# Patient Record
Sex: MALE | Race: WHITE | HISPANIC OR LATINO | Employment: FULL TIME | URBAN - METROPOLITAN AREA
[De-identification: names, ages, dates, MRNs, and addresses within clinical notes are randomized per-mention and may not be internally consistent; named-entity substitution may affect disease eponyms.]

---

## 2018-08-03 ENCOUNTER — OFFICE VISIT (OUTPATIENT)
Dept: FAMILY MEDICINE CLINIC | Facility: CLINIC | Age: 48
End: 2018-08-03

## 2018-08-03 VITALS
OXYGEN SATURATION: 99 % | BODY MASS INDEX: 27.1 KG/M2 | WEIGHT: 168.6 LBS | HEART RATE: 54 BPM | DIASTOLIC BLOOD PRESSURE: 90 MMHG | HEIGHT: 66 IN | SYSTOLIC BLOOD PRESSURE: 132 MMHG

## 2018-08-03 DIAGNOSIS — J30.89 CHRONIC NON-SEASONAL ALLERGIC RHINITIS, UNSPECIFIED TRIGGER: ICD-10-CM

## 2018-08-03 DIAGNOSIS — J45.990 EXERCISE-INDUCED BRONCHOSPASM: Primary | ICD-10-CM

## 2018-08-03 PROCEDURE — 99203 OFFICE O/P NEW LOW 30 MIN: CPT | Performed by: INTERNAL MEDICINE

## 2018-08-03 RX ORDER — ALBUTEROL SULFATE 90 UG/1
2 AEROSOL, METERED RESPIRATORY (INHALATION)
COMMUNITY
Start: 2018-04-19

## 2018-08-03 RX ORDER — CETIRIZINE HYDROCHLORIDE 10 MG/1
10 TABLET ORAL
COMMUNITY

## 2018-08-03 NOTE — PROGRESS NOTES
"Subjective   Poncho Patel is a 48 y.o. male who presents today for:    Exercise induced asthma (NP est care)    History of Present Illness     Reports chest tightness and wheezing whenever he would ride cyclocross.  This has diminished recently because he is not riding as much.  He usually does not get tightness in his chest or wheezing with his allergy flares.  Does not undergone formal testing for asthma.  He has used an albuterol inhaler for exercise induced bronchospasm in the past with good benefit.    He does have chronic allergies for which he has undergone immunotherapy on 2 occasions.  Currently, he only needs Zyrtec once a day to control his rhinitis.    Mr. Patel  reports that he has never smoked. He has never used smokeless tobacco. He reports that he drinks alcohol. Drug use questions deferred to the physician.     No Known Allergies    Current Outpatient Prescriptions:   •  albuterol (PROVENTIL HFA;VENTOLIN HFA) 108 (90 Base) MCG/ACT inhaler, Inhale 2 puffs., Disp: , Rfl:   •  cetirizine (zyrTEC) 10 MG tablet, Take 10 mg by mouth., Disp: , Rfl:       Review of Systems   Constitutional: Negative for unexpected weight change.   Respiratory: Positive for apnea, shortness of breath and wheezing.         Per HPI   Cardiovascular: Negative for chest pain and palpitations.     Patient Health History Form was reviewed during the ov.      Objective   Vitals:    08/03/18 0847   BP: 132/90   Pulse: 54   SpO2: 99%   Weight: 76.5 kg (168 lb 9.6 oz)   Height: 167.6 cm (66\")     Physical Exam   Constitutional: He is oriented to person, place, and time. He appears well-developed and well-nourished. No distress.   Eyes: Conjunctivae are normal. No scleral icterus.   Cardiovascular: Normal rate, regular rhythm and normal heart sounds.    Pulmonary/Chest: Effort normal and breath sounds normal. He has no wheezes.   Abdominal: Soft. Bowel sounds are normal.   Neurological: He is alert and oriented to person, place, " and time.             Poncho was seen today for exercise induced asthma.    Diagnoses and all orders for this visit:    Exercise-induced bronchospasm    Chronic non-seasonal allergic rhinitis, unspecified trigger    Continue the Zyrtec for the allergies.  If allergies worsen despite it, we will consider using Singulair.    Continue using the albuterol when needed prior to exercise.  If symptoms escalate and he is having symptoms at rest, he will contact our office so we can arrange formal pulmonary function testing and a follow-up thereafter.  We'll contact us if he needs a refill for his Proventil as well.

## 2019-04-19 DIAGNOSIS — Z00.00 ROUTINE GENERAL MEDICAL EXAMINATION AT A HEALTH CARE FACILITY: Primary | ICD-10-CM

## 2019-04-21 ENCOUNTER — RESULTS ENCOUNTER (OUTPATIENT)
Dept: FAMILY MEDICINE CLINIC | Facility: CLINIC | Age: 49
End: 2019-04-21

## 2019-04-21 DIAGNOSIS — Z00.00 ROUTINE GENERAL MEDICAL EXAMINATION AT A HEALTH CARE FACILITY: ICD-10-CM

## 2019-05-02 ENCOUNTER — OFFICE VISIT (OUTPATIENT)
Dept: FAMILY MEDICINE CLINIC | Facility: CLINIC | Age: 49
End: 2019-05-02

## 2019-05-02 VITALS
HEART RATE: 66 BPM | OXYGEN SATURATION: 98 % | BODY MASS INDEX: 29.44 KG/M2 | RESPIRATION RATE: 18 BRPM | TEMPERATURE: 98.2 F | WEIGHT: 183.2 LBS | HEIGHT: 66 IN | DIASTOLIC BLOOD PRESSURE: 62 MMHG | SYSTOLIC BLOOD PRESSURE: 110 MMHG

## 2019-05-02 DIAGNOSIS — Z00.00 ANNUAL PHYSICAL EXAM: Primary | ICD-10-CM

## 2019-05-02 PROCEDURE — 99396 PREV VISIT EST AGE 40-64: CPT | Performed by: NURSE PRACTITIONER

## 2019-05-02 NOTE — PROGRESS NOTES
Subjective   Poncho Patel is a 48 y.o. male.     Chief Complaint   Patient presents with   • Annual Exam     Preventative      Mr. Patel presents today for his annual physical exam. He states he is doing well and voices no concerns today.     I have reviewed the patient's medical history in detail and updated the computerized patient record.    The following portions of the patient's history were reviewed and updated as appropriate: allergies, current medications, past family history, past medical history, past social history, past surgical history and problem list.      Current Outpatient Medications:   •  albuterol (PROVENTIL HFA;VENTOLIN HFA) 108 (90 Base) MCG/ACT inhaler, Inhale 2 puffs., Disp: , Rfl:   •  cetirizine (zyrTEC) 10 MG tablet, Take 10 mg by mouth., Disp: , Rfl:      Review of Systems   Constitutional: Negative.    Respiratory: Negative.    Cardiovascular: Negative.    Musculoskeletal: Negative.    Skin: Negative.    Neurological: Negative.    All other systems reviewed and are negative.      Objective    Vitals:    05/02/19 0904   BP: 110/62   Pulse: 66   Resp: 18   Temp: 98.2 °F (36.8 °C)   SpO2: 98%     Physical Exam   Constitutional: He is oriented to person, place, and time. He appears well-developed and well-nourished.   HENT:   Right Ear: External ear normal.   Left Ear: External ear normal.   Mouth/Throat: Oropharynx is clear and moist.   Neck: Normal range of motion. Neck supple. No thyromegaly present.   Cardiovascular: Normal rate, regular rhythm, normal heart sounds and intact distal pulses.   Pulmonary/Chest: Effort normal and breath sounds normal.   Abdominal: Soft. Bowel sounds are normal.   Musculoskeletal: Normal range of motion. He exhibits no edema.   Lymphadenopathy:     He has no cervical adenopathy.   Neurological: He is alert and oriented to person, place, and time.   Skin: Skin is warm and dry.   Psychiatric:   No acute distress   Vitals reviewed.        Assessment/Plan    Poncho was seen today for annual exam.    Diagnoses and all orders for this visit:    Annual physical exam    1. His physical exam is within normal limits today.   2. He is up to date with his health maintenance screening.  3. We discussed age appropriate behaviors such as getting regular exercise and eating a healthy diet.  4. He did not get his labs drawn prior to this exam.  He is fasting today and he can have the labs drawn while he is here.   5. He is to follow up as needed and in 1 year for his next annual exam.

## 2019-05-03 LAB
ALBUMIN SERPL-MCNC: 4.4 G/DL (ref 3.5–5.2)
ALBUMIN/GLOB SERPL: 1.5 G/DL
ALP SERPL-CCNC: 74 U/L (ref 39–117)
ALT SERPL-CCNC: 30 U/L (ref 1–41)
APPEARANCE UR: CLEAR
AST SERPL-CCNC: 20 U/L (ref 1–40)
BILIRUB SERPL-MCNC: 0.5 MG/DL (ref 0.2–1.2)
BILIRUB UR QL STRIP: NEGATIVE
BUN SERPL-MCNC: 10 MG/DL (ref 6–20)
BUN/CREAT SERPL: 9 (ref 7–25)
CALCIUM SERPL-MCNC: 10.1 MG/DL (ref 8.6–10.5)
CHLORIDE SERPL-SCNC: 99 MMOL/L (ref 98–107)
CHOLEST SERPL-MCNC: 245 MG/DL (ref 0–200)
CO2 SERPL-SCNC: 26.6 MMOL/L (ref 22–29)
COLOR UR: YELLOW
CREAT SERPL-MCNC: 1.11 MG/DL (ref 0.76–1.27)
ERYTHROCYTE [DISTWIDTH] IN BLOOD BY AUTOMATED COUNT: 12.4 % (ref 12.3–15.4)
GLOBULIN SER CALC-MCNC: 3 GM/DL
GLUCOSE SERPL-MCNC: 103 MG/DL (ref 65–99)
GLUCOSE UR QL: NEGATIVE
HCT VFR BLD AUTO: 44 % (ref 37.5–51)
HDLC SERPL-MCNC: 54 MG/DL (ref 40–60)
HGB BLD-MCNC: 15 G/DL (ref 13–17.7)
HGB UR QL STRIP: NEGATIVE
KETONES UR QL STRIP: NEGATIVE
LDLC SERPL CALC-MCNC: 156 MG/DL (ref 0–100)
LEUKOCYTE ESTERASE UR QL STRIP: NEGATIVE
MCH RBC QN AUTO: 30.5 PG (ref 26.6–33)
MCHC RBC AUTO-ENTMCNC: 34.1 G/DL (ref 31.5–35.7)
MCV RBC AUTO: 89.4 FL (ref 79–97)
NITRITE UR QL STRIP: NEGATIVE
PH UR STRIP: 6 [PH] (ref 5–8)
PLATELET # BLD AUTO: 227 10*3/MM3 (ref 140–450)
POTASSIUM SERPL-SCNC: 4.5 MMOL/L (ref 3.5–5.2)
PROT SERPL-MCNC: 7.4 G/DL (ref 6–8.5)
PROT UR QL STRIP: NEGATIVE
RBC # BLD AUTO: 4.92 10*6/MM3 (ref 4.14–5.8)
SODIUM SERPL-SCNC: 138 MMOL/L (ref 136–145)
SP GR UR: 1.02 (ref 1–1.03)
TRIGL SERPL-MCNC: 174 MG/DL (ref 0–150)
UROBILINOGEN UR STRIP-MCNC: NORMAL MG/DL
VLDLC SERPL CALC-MCNC: 34.8 MG/DL
WBC # BLD AUTO: 5.03 10*3/MM3 (ref 3.4–10.8)

## 2023-08-09 ENCOUNTER — TREATMENT (OUTPATIENT)
Dept: PHYSICAL THERAPY | Facility: CLINIC | Age: 53
End: 2023-08-09
Payer: COMMERCIAL

## 2023-08-09 DIAGNOSIS — M25.862 PATELLOFEMORAL DYSFUNCTION OF LEFT KNEE: ICD-10-CM

## 2023-08-09 DIAGNOSIS — M25.562 LEFT KNEE PAIN, UNSPECIFIED CHRONICITY: Primary | ICD-10-CM

## 2023-08-09 PROCEDURE — 97161 PT EVAL LOW COMPLEX 20 MIN: CPT | Performed by: PHYSICAL THERAPIST

## 2023-08-09 PROCEDURE — 97110 THERAPEUTIC EXERCISES: CPT | Performed by: PHYSICAL THERAPIST

## 2023-08-09 PROCEDURE — 97530 THERAPEUTIC ACTIVITIES: CPT | Performed by: PHYSICAL THERAPIST

## 2023-08-09 NOTE — PROGRESS NOTES
"Physical Therapy Initial Evaluation and Plan of Care  [address], [address], [place] [address]    Patient: Poncho Patel   : 1970  Diagnosis/ICD-10 Code:  Left knee pain, unspecified chronicity [M25.562]  Referring practitioner: Louisa Tucker DO    Subjective Evaluation    History of Present Illness  Onset date: May 2023.  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  He states his (L) knee began bothering him around May 2023, and then (B) wrists began hurting.  Within past 3-4 days his (R) knee has also become painful.      He began noticing ascending steps would cause pain at (L) superolateral aspect of knee.  Then he noticed swelling and a feeling of (L) knee begin \"not normal.\"  It felt at times to him somewhat unstable, but this would improve with a neoprene knee sleeve.  His (L) knee flexion became limited and it became painful to kneel on (L) knee.  He reports infrequent painful popping in his (L) knee which causes his knee to feel better afterward.      He is an avid cyclist and has not been cycling out of hesitation of not wanting to make his knee worse.  Patient will be undergoing some updated bloodwork near the end of August.      Patient Occupation: [employer] (works from home)   Precautions and Work Restrictions: hobbies - cycling, son wants him to begin participating in disc golfQuality of life: good    Pain  Current pain ratin  At best pain ratin  At worst pain ratin  Location: (L) superolateral aspect of anterior knee  Quality: sharp  Alleviating factors: consciously using legs more symmetrically.  Aggravating factors: prolonged positioning, stairs and squatting (running)  Progression: no change    Diagnostic Tests  X-ray: normal (mild lateral tilt)    Patient Goals  Patient goal: understanding what is going on, knowing if I need to avoid certain activities, understand whether this will go away         Subjective Questionnaire: LEFS: " "50/80    Objective          Tenderness     Additional Tenderness Details  Neg TTP (B) knees    Active Range of Motion   Left Knee   Flexion: 126 degrees with pain  Extension: 0 degrees   Extensor la degrees     Right Knee   Flexion: 122 degrees with pain  Extension: 0 degrees   Extensor lag: 3 degrees     Patellar Mobility   Left Knee Patellar tendons within functional limits include the lateral. Hypermobile in the left medial patellar tendon(s). Hypomobile in the left superior and left inferior patellar tendon(s).     Right Knee Patellar tendons within functional limits include the lateral. Hypermobile in the medial patellar tendon(s). Hypomobile in the superior and inferior patellar tendon(s).     Strength/Myotome Testing     Left Hip   Planes of Motion   Flexion: 4-  Extension: 4  Abduction: 4  External rotation: 4-  Internal rotation: 4-    Right Hip   Planes of Motion   Flexion: 4  Extension: 4  Abduction: 4  External rotation: 4-  Internal rotation: 4-    Left Knee   Flexion: 4+  Extension: 4+  Quadriceps contraction: good    Right Knee   Flexion: 4+  Extension: 4+  Quadriceps contraction: good    Swelling     Left Knee Girth Measurement (cm)   Joint line: 39.4.    Right Knee Girth Measurement (cm)   Joint line: 38.7.    Functional Assessment     Forward Step Down 6\"   Left Leg  Positive Trendelenburg, ipsilateral trunk side bending, increased forward trunk lean and valgus.     Right Leg  Positive Trendelenburg and increased forward trunk lean.     Comments  Functional hip strength: single leg bridge - moderate pelvic drop (B)  Single leg sit to stand -- great difficulty (L) LE with (L) superomedial knee pain; mild difficulty (R) LE without pain          Assessment & Plan     Assessment  Impairments: abnormal muscle firing, activity intolerance, impaired physical strength, lacks appropriate home exercise program and pain with function  Functional Limitations: uncomfortable because of pain and " "stooping  Assessment details: Patient is a 53 y.o male who reports onset of (L) knee pain around May 2023, initially noticeable with ascending steps.  Since then, patient has also experienced onset of (B) wrist pain and swelling and more recently (past 2-3 days) (R) knee pain similar to his (L).  Xray revealed mild (L) patellar lateral tilt and trace tricompartmental arthritis.  He reports (L) knee symptoms rated 0-7/10, worst with ascending steps, prolonged sitting with knees flexed, and squatting.  He has been avoiding cycling for fear of provoking symptoms.  He exhibits decreased (B) knee AROM as well as trunk and proximal LE strength.  His LEFS score is 50/80 indicating a moderate perceived level of functional limitation.  He may benefit from skilled PT services to address these deficits and assist patient in return to optimal functional level including regular painfree cycling.  Prognosis: good    Goals  Plan Goals: STGs: to be met in 4 weeks  1. Patient will be independent with initial HEP  2. Patient will report improved (B) knee symptoms 0-3/10 for improved tolerance to ADLs and functional activities  3. Patient will report 50% reduced frequency of (L) knee feeling \"unstable\" for improved activity tolerance  4. Patient will demonstrate improved (B) knee AROM 0-130 deg for improved positional tolerance  5. Patient will perform 3 x 10 single leg bridge with SLR without pelvic drop as evidence of improved functional trunk and hip strength to improve knee alignment during weightbearing activities    LTGs: to be met in 8 weeks  1. Patient will be independent with progressed HEP  2. Patient will report resolution of (B) knee symptoms for normal tolerance to negotiating steps within his home  3. Patient will demonstrate single leg bridge endurance test x 10 sec (B) without compensation for evidence of sufficient trunk and hips muscular endurance to improve lower extremity control  4. Patient will perform forward " "stepdown from 6\" height with good pelvic stability and absence of dynamic valgus as evidence of sufficient neuromuscular control to normalize LE loading activities  5. Patient will successfully return to cycling at reasonable pace and volume  6. Patient will have improved LEFS score >/= 60/80 for subjective evidence of functional improvement    Plan  Therapy options: will be seen for skilled therapy services  Planned modality interventions: cryotherapy, electrical stimulation/Russian stimulation and thermotherapy (hydrocollator packs)  Planned therapy interventions: abdominal trunk stabilization, flexibility, functional ROM exercises, gait training, home exercise program, joint mobilization, manual therapy, neuromuscular re-education, soft tissue mobilization, strengthening, stretching and therapeutic activities  Frequency: 2x week  Duration in weeks: 12  Treatment plan discussed with: patient      Manual Therapy:         mins  49561;  Therapeutic Exercise:    16     mins  56424;     Neuromuscular June:        mins  51844;    Therapeutic Activity:     12     mins  75734;     Gait Training:           mins  23681;     Ultrasound:          mins  51534;    Electrical Stimulation:         mins  23973 (MC );  Dry Needling          mins self-pay    Timed Treatment:   28   mins   Total Treatment:     56   mins    PT SIGNATURE: Shannan Wright PT, DPT, OCS  Electronically signed by: Shannan Wright PT, 08/09/23, 9:10 AM EDT  [place] License #[id number]     DATE TREATMENT INITIATED: 8/9/2023    Initial Certification  Certification Period: 8/9/2023 thru 11/6/2023  I certify that the therapy services are furnished while this patient is under my care.  The services outlined above are required by this patient, and will be reviewed every 90 days.     PHYSICIAN: Louisa Tucker DO  [phone]                                          DATE:     Please sign and return via fax to [phone]. Thank you, [facility] " Therapy.

## 2023-08-16 ENCOUNTER — TREATMENT (OUTPATIENT)
Dept: PHYSICAL THERAPY | Facility: CLINIC | Age: 53
End: 2023-08-16
Payer: COMMERCIAL

## 2023-08-16 DIAGNOSIS — M25.862 PATELLOFEMORAL DYSFUNCTION OF LEFT KNEE: ICD-10-CM

## 2023-08-16 DIAGNOSIS — M25.562 LEFT KNEE PAIN, UNSPECIFIED CHRONICITY: Primary | ICD-10-CM

## 2023-08-16 PROCEDURE — 97112 NEUROMUSCULAR REEDUCATION: CPT | Performed by: PHYSICAL THERAPIST

## 2023-08-16 PROCEDURE — 97110 THERAPEUTIC EXERCISES: CPT | Performed by: PHYSICAL THERAPIST

## 2023-08-16 NOTE — PROGRESS NOTES
Physical Therapy Daily Treatment Note      [address], [address], [place] [id number]    Patient: Poncho Patel   : 1970  Referring practitioner: Louisa Tucker DO  Date of Initial Visit: Type: THERAPY  Noted: 2023  Today's Date: 2023  Patient seen for 2 sessions         Visit Diagnoses:     ICD-10-CM ICD-9-CM   1. Left knee pain, unspecified chronicity  M25.562 719.46   2. Patellofemoral dysfunction of left knee  M25.862 719.86         Subjective Evaluation    History of Present Illness    Subjective comment: I have stiffness in the mornings which improves after I get moving.  The hand surgeon said xrays of my wrists look ok so he is ordering (B) wrist/hand MRIs.  My right knee is acting up more than my left one, so it feels like my body is running through a course of something.Pain  Pain scale: 1-2/10 (R) knee, 0/10 (L) knee.         Objective   See Exercise, Manual, and Modality Logs for complete treatment.   *Initiated eccentric chair squat and standing fire hyrdrant    Assessment & Plan       Assessment  Assessment details: Patient currently reports (R) knee symptoms > (L) knee symptoms.  It remains concerning that patient is experiencing bilateral multi-joint symptoms which appear in similar locations and seem to run a similar course.  Patient benefits from corrective cueing during single leg bridge activity but does a good job of self-monitoring form throughout all activities.  Instructed in eccentric chair squats and standing fire hydrants to progress hip stabilizer strengthening.  Patient exhibits functional hip stabilizer weakness at end range eccentric component of chair squat and in stance leg during standing fire hydrants, evidenced by early onset of muscular fatigue.        Progress strengthening /stabilization /functional activity         Timed:  Manual Therapy:         mins  37950;  Therapeutic Exercise:    14     mins  73615;     Neuromuscular June:    29    mins   95021;    Therapeutic Activity:          mins  43764;     Gait Training:           mins  86295;     Ultrasound:          mins  33639;    Untimed:  Electrical Stimulation:         mins  14169 ( );  Mechanical Traction:         mins  10057;   Dry Needling              ___  mins   20561    Timed Treatment:   43   mins   Total Treatment:     43   mins    Shannan Wright PT, DPT, OCS  Physical Therapist  [place] License #[id number]  Electronically signed by: Shannan Wright PT, 08/16/23, 9:15 AM EDT

## 2023-08-23 ENCOUNTER — TREATMENT (OUTPATIENT)
Dept: PHYSICAL THERAPY | Facility: CLINIC | Age: 53
End: 2023-08-23
Payer: COMMERCIAL

## 2023-08-23 DIAGNOSIS — M25.862 PATELLOFEMORAL DYSFUNCTION OF LEFT KNEE: ICD-10-CM

## 2023-08-23 DIAGNOSIS — M25.562 LEFT KNEE PAIN, UNSPECIFIED CHRONICITY: Primary | ICD-10-CM

## 2023-08-23 PROCEDURE — 97110 THERAPEUTIC EXERCISES: CPT | Performed by: PHYSICAL THERAPIST

## 2023-08-23 PROCEDURE — 97112 NEUROMUSCULAR REEDUCATION: CPT | Performed by: PHYSICAL THERAPIST

## 2023-08-23 NOTE — PROGRESS NOTES
Physical Therapy Daily Treatment Note      [address], [address], [place] [id number]    Patient: Poncho Patel   : 1970  Referring practitioner: Louisa Tucker DO  Date of Initial Visit: Type: THERAPY  Noted: 2023  Today's Date: 2023  Patient seen for 3 sessions         Visit Diagnoses:     ICD-10-CM ICD-9-CM   1. Left knee pain, unspecified chronicity  M25.562 719.46   2. Patellofemoral dysfunction of left knee  M25.862 719.86         Subjective Evaluation    History of Present Illness    Subjective comment: I think my knees are running through the course of whatever irritated them.  The right knee is more uncomfortable than the left one right know but I think they're both a little better.  I am not sure whether that is from the Meloxicam, collagen supplements, and or exercises.  I have MRIs on both wrists tomorrow.     Objective   See Exercise, Manual, and Modality Logs for complete treatment.   *Added forward stepdown/heel lowering and Y balance    Assessment & Plan       Assessment  Assessment details: Patient demonstrates improved ability to perform chair squat and standing fire hydrant activities without notable compensation and increased time to onset of muscular fatigue.  Discussed at length the role of core/trunk and hip strength on knee position/alignment and function as well as tendency toward patellar maltracking.  He benefits from visual cueing offered by mirror placed in front of him during performance of closed chain strength and stability activities so he is able to effectively monitor his form.        Progress strengthening /stabilization /functional activity. Assess for MD.         Timed:  Manual Therapy:         mins  84650;  Therapeutic Exercise:    23     mins  55626;     Neuromuscular June:    28    mins  70335;    Therapeutic Activity:          mins  38933;     Gait Training:           mins  90428;     Ultrasound:          mins  52707;    Untimed:  Electrical  Stimulation:         mins  47343 ( );  Mechanical Traction:         mins  86458;   Dry Needling              ___  mins   20561    Timed Treatment:   51   mins   Total Treatment:     51   mins    Shannan Wright PT, DPT, [facility]  Physical Therapist  [place] License #[id number]  Electronically signed by: Shannan Wright PT, 08/23/23, 9:29 AM EDT

## 2023-08-30 ENCOUNTER — TREATMENT (OUTPATIENT)
Dept: PHYSICAL THERAPY | Facility: CLINIC | Age: 53
End: 2023-08-30
Payer: COMMERCIAL

## 2023-08-30 DIAGNOSIS — M25.862 PATELLOFEMORAL DYSFUNCTION OF LEFT KNEE: ICD-10-CM

## 2023-08-30 DIAGNOSIS — M25.562 LEFT KNEE PAIN, UNSPECIFIED CHRONICITY: Primary | ICD-10-CM

## 2023-08-30 PROCEDURE — 97110 THERAPEUTIC EXERCISES: CPT | Performed by: PHYSICAL THERAPIST

## 2023-08-30 PROCEDURE — 97112 NEUROMUSCULAR REEDUCATION: CPT | Performed by: PHYSICAL THERAPIST

## 2023-08-30 NOTE — PROGRESS NOTES
"Physical Therapy Daily Treatment Note               [address]                                                                                                                                             [place] [address]    Patient: Poncho Patel   : 1970  Referring practitioner: Louisa Tucker DO  Date of Initial Visit: Type: THERAPY  Noted: 2023  Today's Date: 2023  Patient seen for 4 sessions       Visit Diagnoses:    ICD-10-CM ICD-9-CM   1. Left knee pain, unspecified chronicity  M25.562 719.46   2. Patellofemoral dysfunction of left knee  M25.862 719.86       Subjective Evaluation    History of Present Illness    Subjective comment: Pt reports that his (B) knees \"feel unstable\", but he has not had any issues with them actually being unstable.     Objective   See Exercise, Manual, and Modality Logs for complete treatment.       Assessment & Plan       Assessment  Assessment details: Pt continues to demonstrate improvement in balance and muscular endurance.  Pt has not been able perform his Y balance drill at home due to his carpet.  Demonstrated modification of cone taps.  Continue to progress per POC.        Timed:         Manual Therapy:    0     mins  64968;     Therapeutic Exercise:    13     mins  77287;     Neuromuscular June:    25    mins  72754;    Therapeutic Activity:     0     mins  07501;     Gait Trainin     mins  03472;     Ultrasound:     0     mins  99978;    E Stim                            00    mins   26863( [id number])  Work [place]/Cond      0    mins   88680        Timed Treatment:   38   mins   Total Treatment:     38   mins    Benny Ortez PTA  [place] License: [id number]  "

## 2023-08-31 ENCOUNTER — OFFICE VISIT (OUTPATIENT)
Dept: SPORTS MEDICINE | Facility: CLINIC | Age: 53
End: 2023-08-31
Payer: COMMERCIAL

## 2023-08-31 DIAGNOSIS — M25.862 PATELLOFEMORAL DYSFUNCTION OF LEFT KNEE: ICD-10-CM

## 2023-08-31 DIAGNOSIS — M25.562 LEFT KNEE PAIN, UNSPECIFIED CHRONICITY: Primary | ICD-10-CM

## 2023-08-31 DIAGNOSIS — R29.898 IMPAIRED FLEXIBILITY OF LOWER EXTREMITY: ICD-10-CM

## 2023-08-31 NOTE — PROGRESS NOTES
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  Initial images showed trace tricompartmental narrowing and spurring and lateral patellar tilt. Symptoms appeared consistent with patellofemoral dysfunction and conservative and supportive management was recommended. Please see previous notes for complete history and treatment course. He returns today reporting some improvement. Still has pain in the superolateral aspect. Pain is usually 3/10 but can be 6-7/10 at worst. Has noticed some discrepancy in his strength with PT. Has had 4 session of PT and has been compliant with his HEP. Has been taking Meloxicam daily and also taking a collagen supplement. Did a 5 mile ride without issues. Has also been playing disc golf once per week. Last week he states that his right knee was slightly worse than left.  Denies any new injuries.    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  He has recently had a flare of bilateral wrist pain and has been seeing a specialist. Was told that x-rays were normal but is awaiting results from his bilateral MRI and has a follow-up with Dr. Rodriguez in a couple weeks.     He is an avid cyclist.  Reports being left leg dominant.  He had COVID in December and subsequent elevated resting heart rate so was slow to return to cycling.  He does admit to trying to return in May with significantly decreased distance for him (10 miles or about 30 minutes), but then had to stop secondary to the knee pain.       /84   Pulse 65   Temp 97.7 °F (36.5 °C) (Infrared)   Resp 14   Ht 167.6 cm (65.98\")   Wt 80.7 kg (178 lb)   SpO2 98%   BMI 28.75 kg/m²      Physical Exam  [facility] " Exam:  The left knee is without obvious signs of acute bony deformity, quadriceps atrophy, swelling, erythema, ecchymosis or joint effusion. Patellar tenderness has improved. Apprehension is negative with medial and lateral glide. Patella crepitus is mildly positive. The medial and lateral joint lines are nontender and without bony crepitus or step-off. Flexion and extension are WNL and symmetrical. Knee and hip strength is 4+/5. Varus & valgus stress, Lachman's, anterior drawer, Alicia's, and posterior drawer are all negative. Gait is pain-free and tandem.    Assessment and Plan  Diagnoses and all orders for this visit:    1. Left knee pain, unspecified chronicity (Primary)    2. Patellofemoral dysfunction of left knee    3. 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  Initial images showed trace tricompartmental narrowing and spurring and lateral patellar tilt. Symptoms appeared consistent with patellofemoral dysfunction and conservative and supportive management was recommended.  He returns today with some improvement, but mildly persistent symptoms. I stressed the importance of continuing with PT and compliance with his HEP. He may continue with supportive measures such as bracing, ice and/or heat, anti-inflammatories, and Tylenol as needed. We reviewed his activity level and I am okay with him very gradually returning to cycling so long as his symptoms remain mild. HE should continue to follow-up with Dr. Rodriguez. If there is an underlying inflammatory arthritis, this may be contributing to his symptoms.  We will follow-up in 8 weeks. All of his questions were answered and he is agreeable with the plan.    Total time: 50 minutes. This includes time spent with the patient, but also time spent before the visit reviewing the  chart and time after the visit documenting the visit, reviewing labs, imaging studies, etc.      Dictated utilizing Dragon dictation.

## 2023-09-06 ENCOUNTER — TREATMENT (OUTPATIENT)
Dept: PHYSICAL THERAPY | Facility: CLINIC | Age: 53
End: 2023-09-06
Payer: COMMERCIAL

## 2023-09-06 DIAGNOSIS — M25.562 LEFT KNEE PAIN, UNSPECIFIED CHRONICITY: Primary | ICD-10-CM

## 2023-09-06 DIAGNOSIS — M25.862 PATELLOFEMORAL DYSFUNCTION OF LEFT KNEE: ICD-10-CM

## 2023-09-06 PROCEDURE — 97110 THERAPEUTIC EXERCISES: CPT | Performed by: PHYSICAL THERAPIST

## 2023-09-06 PROCEDURE — 97112 NEUROMUSCULAR REEDUCATION: CPT | Performed by: PHYSICAL THERAPIST

## 2023-09-06 NOTE — PROGRESS NOTES
Physical Therapy Daily Treatment Note      [address], [address], [place] [address]    Patient: Poncho Patel   : 1970  Referring practitioner: Louisa Tucker DO  Date of Initial Visit: Type: THERAPY  Noted: 2023  Today's Date: 2023  Patient seen for 5 sessions         Visit Diagnoses:     ICD-10-CM ICD-9-CM   1. Left knee pain, unspecified chronicity  M25.562 719.46   2. Patellofemoral dysfunction of left knee  M25.862 719.86         Subjective Evaluation    History of Present Illness    Subjective comment: My left knee started acting up yesterday, but I had a couple days of being out on the disc golf [place] this weekend.  It feels like a mechanical type pain and is mostly in the back of the knee instead of the front. I did ride about 5 miles (18-20 minutes) on the . My knees were initially a little stiff but it got easier as I went on.     Objective   See Exercise, Manual, and Modality Logs for complete treatment.       Assessment & Plan       Assessment  Assessment details: Patient appears to have some lingering lower leg and ankle stabilizer mm fatigue, likely from 2 consecutive days on the disc golf course, half of which is quite hilly.  Patient admits some fluctuation in (B) knee symptoms which he cannot attribute to activity or specific provocation.  Discussed careful and gradual progression of cycling to conservatively increase time and frequency (per week) with a goal of returning to riding approximately 30-45 minutes 4-5 times per week eventually, symptom-free.  Instructed patient to avoid aggressiveness of his disc golf throw to minimize (L) knee torque. Encouraged persistent HEP compliance and issued blue band for progression.  Plan to await outcome of patient's appointment with Dr. Rodriguez to discuss wrist/hand MRI results and plan.        Other - await outcome of patient appointment with Dr. Rodriguez.         Timed:  Manual Therapy:         mins   16608;  Therapeutic Exercise:    15     mins  16238;     Neuromuscular June:    26    mins  45225;    Therapeutic Activity:          mins  98389;     Gait Training:           mins  80298;     Ultrasound:          mins  77877;    Untimed:  Electrical Stimulation:         mins  55182 ( );  Mechanical Traction:         mins  51818;   Dry Needling              ___  mins   09973    Timed Treatment:   41   mins   Total Treatment:     44   mins    Shannan Wright PT, DPT, OCS  Physical Therapist  [place] License #[id number]  Electronically signed by: Shannan Wright PT, 09/06/23, 9:24 AM EDT

## 2023-09-14 VITALS
BODY MASS INDEX: 28.61 KG/M2 | HEIGHT: 66 IN | TEMPERATURE: 97.7 F | WEIGHT: 178 LBS | SYSTOLIC BLOOD PRESSURE: 128 MMHG | DIASTOLIC BLOOD PRESSURE: 84 MMHG | HEART RATE: 65 BPM | RESPIRATION RATE: 14 BRPM | OXYGEN SATURATION: 98 %

## 2023-09-28 ENCOUNTER — OFFICE VISIT (OUTPATIENT)
Dept: FAMILY MEDICINE CLINIC | Facility: CLINIC | Age: 53
End: 2023-09-28
Payer: COMMERCIAL

## 2023-09-28 VITALS
SYSTOLIC BLOOD PRESSURE: 128 MMHG | DIASTOLIC BLOOD PRESSURE: 84 MMHG | HEIGHT: 66 IN | HEART RATE: 87 BPM | WEIGHT: 174.1 LBS | OXYGEN SATURATION: 99 % | BODY MASS INDEX: 27.98 KG/M2

## 2023-09-28 DIAGNOSIS — Z00.00 ANNUAL PHYSICAL EXAM: Primary | ICD-10-CM

## 2023-09-28 PROCEDURE — 99396 PREV VISIT EST AGE 40-64: CPT | Performed by: NURSE PRACTITIONER

## 2023-09-28 NOTE — PROGRESS NOTES
"Chief Complaint  Annual Exam    Subjective        Poncho Patel presents to [facility]  History of Present Illness  Mr. Patel presents today for an annual physical exam with lab review.     I have reviewed the patient's medical history in detail and updated the computerized patient record.       Current Outpatient Medications:     albuterol sulfate  (90 Base) MCG/ACT inhaler, Inhale 2 puffs Every 4 (Four) Hours As Needed for Wheezing., Disp: 18 g, Rfl: 5    cetirizine (zyrTEC) 10 MG tablet, Take 1 tablet by mouth., Disp: , Rfl:     meloxicam (Mobic) 7.5 MG tablet, Take 1 tablet by mouth Daily. (Patient not taking: Reported on 9/28/2023), Disp: 90 tablet, Rfl: 1     Review of Systems   Constitutional: Negative.    HENT: Negative.     Eyes: Negative.    Respiratory: Negative.     Cardiovascular: Negative.    Gastrointestinal: Negative.    Genitourinary: Negative.    Musculoskeletal:  Positive for arthralgias (wrists, knees).   Neurological: Negative.    Psychiatric/Behavioral: Negative.        Objective   Vital Signs:  /84 (BP Location: Left arm, Patient Position: Sitting, Cuff Size: Adult)   Pulse 87   Ht 167.6 cm (65.98\")   Wt 79 kg (174 lb 1.6 oz)   SpO2 99%   BMI 28.11 kg/m²   Estimated body mass index is 28.11 kg/m² as calculated from the following:    Height as of this encounter: 167.6 cm (65.98\").    Weight as of this encounter: 79 kg (174 lb 1.6 oz).       Physical Exam  Constitutional:       Appearance: Normal appearance.   Neck:      Vascular: No carotid bruit.   Cardiovascular:      Rate and Rhythm: Normal rate and regular rhythm.      Pulses: Normal pulses.      Heart sounds: Normal heart sounds.   Pulmonary:      Effort: Pulmonary effort is normal.      Breath sounds: Normal breath sounds.   Abdominal:      General: Bowel sounds are normal.      Palpations: Abdomen is soft.   Musculoskeletal:         General: Normal range of motion.      Cervical " back: Normal range of motion.   Skin:     General: Skin is warm and dry.   Neurological:      Mental Status: He is alert and oriented to person, place, and time.   Psychiatric:      Comments: No acute distress      Result Review :    Common labs          9/21/2023    08:29   Common Labs   Glucose 104    BUN 11    Creatinine 1.08    Sodium 134    Potassium 4.4    Chloride 96    Calcium 9.8    Total Protein 7.6    Albumin 4.5    Total Bilirubin 0.4    Alkaline Phosphatase 119    AST (SGOT) 16    ALT (SGPT) 15    WBC 6.5    Hemoglobin 13.8    Hematocrit 40.8    Platelets 310    Total Cholesterol 206    Triglycerides 119    HDL Cholesterol 54    LDL Cholesterol  131    Hemoglobin A1C 5.7    PSA 1.6                   Assessment and Plan   Diagnoses and all orders for this visit:    1. Annual physical exam (Primary)    Mr. Patel presents today for an annual physical exam with lab review.   His physical exam is unremarkable.  I have reviewed his labs with him today. His lipid levels are not at goal. And his A1C is slightly elevated. We discussed that life style changes will help improve his labs.  We have discussed age related healthy behaviors.          Follow Up   No follow-ups on file.  Patient was given instructions and counseling regarding his condition or for health maintenance advice. Please see specific information pulled into the AVS if appropriate.

## 2023-10-26 ENCOUNTER — OFFICE VISIT (OUTPATIENT)
Dept: SPORTS MEDICINE | Facility: CLINIC | Age: 53
End: 2023-10-26
Payer: COMMERCIAL

## 2023-10-26 VITALS — BODY MASS INDEX: 27.97 KG/M2 | HEIGHT: 66 IN | WEIGHT: 174 LBS | TEMPERATURE: 98.1 F

## 2023-10-26 DIAGNOSIS — M25.562 LEFT KNEE PAIN, UNSPECIFIED CHRONICITY: Primary | ICD-10-CM

## 2023-10-26 DIAGNOSIS — M25.862 PATELLOFEMORAL DYSFUNCTION OF LEFT KNEE: ICD-10-CM

## 2023-10-26 PROCEDURE — 99213 OFFICE O/P EST LOW 20 MIN: CPT | Performed by: STUDENT IN AN ORGANIZED HEALTH CARE EDUCATION/TRAINING PROGRAM

## 2023-10-26 NOTE — PROGRESS NOTES
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  Initial images showed trace tricompartmental narrowing and spurring and lateral patellar tilt. Symptoms appeared consistent with patellofemoral dysfunction and conservative and supportive management was recommended. Please see previous notes for complete history and treatment course. He returns today reporting mild improvement but persistent symptoms. He has seen Rheumatology who thinks he may possible have psoriatic arthritis but without the skin manifestation. He recently increased his meloxicam to 15 mg on Sunday. He had stopped taking it before then. His left knee still tends to be more bothersome. Pain is rated 2-3/10. Has not yet returned to cycling due to busy work schedule. Played disc golf over the weekend and about [employer] through had increased pain of the knee. Admits to not doing HEP as much lately over the past month. Denies any new injuries or complaints.    He is an avid cyclist.  Reports being left leg dominant.  He had COVID in December 2022 and subsequent elevated resting heart rate so was slow to return to cycling.  He does admit to trying to return in May with significantly decreased distance for him (10 miles or about 30 minutes), but then had to stop secondary to the knee pain.    Temp 98.1 °F (36.7 °C) (Infrared)   Ht 167.6 cm (65.98\")   Wt 78.9 kg (174 lb)   BMI 28.10 kg/m²      Physical Exam  MSK Exam:  The left knee is without obvious signs of acute bony deformity, quadriceps atrophy, swelling, erythema, ecchymosis or joint effusion. Patellar tenderness has improved. Apprehension is negative with medial and lateral glide. Patella crepitus is mildly positive. The medial and lateral joint lines are " nontender and without bony crepitus or step-off. Flexion and extension are WNL and symmetrical. Knee and hip strength is 5/5. Varus & valgus stress, Lachman's, anterior drawer, Alicia's, and posterior drawer are all negative. Gait is pain-free and tandem. No significant change compared to previous.     Assessment and Plan  Diagnoses and all orders for this visit:    1. Left knee pain, unspecified chronicity (Primary)    2. 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  Initial images showed trace tricompartmental narrowing and spurring and lateral patellar tilt. Symptoms appeared consistent with patellofemoral dysfunction and conservative and supportive management was recommended.  He returns today with some improvement, but mildly persistent symptoms. He was recently seen by Rheumatology who thinks he may have psoriatic arthritis. He is now taking meloxicam 15 mg daily. We again reviewed potential treatment options for management for his knee symptoms, including possible injections. We will wait to see how he responds to the increase in his meloxicam and see how his other joint symptoms respond. I again stressed the importance of returning to his HEP and slowly porgressing his activity as tolerated.  He may continue with supportive measures as previously discussed, such as bracing, ice and/or heat, anti-inflammatories, and Tylenol as needed. He should continue to follow-up with his rheumatologist. We will follow-up in another 8-12 weeks or sooner as needed. All of his questions were answered and he is agreeable with the plan.    Dictated utilizing Dragon dictation.

## 2023-12-04 ENCOUNTER — DOCUMENTATION (OUTPATIENT)
Dept: PHYSICAL THERAPY | Facility: CLINIC | Age: 53
End: 2023-12-04
Payer: COMMERCIAL

## 2023-12-04 DIAGNOSIS — M25.862 PATELLOFEMORAL DYSFUNCTION OF LEFT KNEE: ICD-10-CM

## 2023-12-04 DIAGNOSIS — M25.562 LEFT KNEE PAIN, UNSPECIFIED CHRONICITY: Primary | ICD-10-CM

## 2024-09-20 DIAGNOSIS — Z00.00 ANNUAL PHYSICAL EXAM: Primary | ICD-10-CM

## 2024-11-05 ENCOUNTER — APPOINTMENT (OUTPATIENT)
Dept: GENERAL RADIOLOGY | Facility: HOSPITAL | Age: 54
End: 2024-11-05
Payer: COMMERCIAL

## 2024-11-05 ENCOUNTER — HOSPITAL ENCOUNTER (EMERGENCY)
Facility: HOSPITAL | Age: 54
Discharge: HOME | End: 2024-11-05
Attending: EMERGENCY MEDICINE | Admitting: EMERGENCY MEDICINE
Payer: COMMERCIAL

## 2024-11-05 VITALS
RESPIRATION RATE: 18 BRPM | OXYGEN SATURATION: 96 % | WEIGHT: 177 LBS | HEIGHT: 66 IN | BODY MASS INDEX: 28.45 KG/M2 | SYSTOLIC BLOOD PRESSURE: 160 MMHG | HEART RATE: 91 BPM | TEMPERATURE: 97.5 F | DIASTOLIC BLOOD PRESSURE: 95 MMHG

## 2024-11-05 DIAGNOSIS — R13.10 DYSPHAGIA, UNSPECIFIED TYPE: Primary | ICD-10-CM

## 2024-11-05 PROCEDURE — 71045 X-RAY EXAM CHEST 1 VIEW: CPT

## 2024-11-05 PROCEDURE — 99283 EMERGENCY DEPT VISIT LOW MDM: CPT

## 2024-11-05 PROCEDURE — 93010 ELECTROCARDIOGRAM REPORT: CPT | Performed by: INTERNAL MEDICINE

## 2024-11-05 PROCEDURE — 93005 ELECTROCARDIOGRAM TRACING: CPT | Performed by: PHYSICIAN ASSISTANT

## 2024-11-05 RX ORDER — PANTOPRAZOLE SODIUM 40 MG/1
40 TABLET, DELAYED RELEASE ORAL DAILY
Qty: 30 TABLET | Refills: 0 | Status: SHIPPED | OUTPATIENT
Start: 2024-11-05

## 2024-11-05 NOTE — ED NOTES
Pt via PV from home with c/o possible food bolus; pt reports during/after eating lunch he was having epigastric pain and is now unable to swallow food or drink d/t pain and unable to keep it down.

## 2024-11-06 ENCOUNTER — TELEPHONE (OUTPATIENT)
Dept: GASTROENTEROLOGY | Facility: CLINIC | Age: 54
End: 2024-11-06
Payer: COMMERCIAL

## 2024-11-06 LAB
QT INTERVAL: 378 MS
QTC INTERVAL: 423 MS

## 2024-11-06 NOTE — TELEPHONE ENCOUNTER
He can be scheduled with Hanny sooner pls let him know - he would otherwise have to wait for next avail w/me which is Jas (could be put in f/u spot)

## 2024-11-06 NOTE — TELEPHONE ENCOUNTER
I called this pt to schedule a new pt appt.  He was referred to you Dr Mayberry from an ED visit for dysphagia. He is a new pt and doesn't have an actual referral except for the ED visit and I don't have any new pt appts for you at all.  Do you want to try to work him in?  I tried scheduling him with Zuly but he said that he was referred to you and didn't want to be scheduled with a PA.

## 2024-11-06 NOTE — ED PROVIDER NOTES
EMERGENCY DEPARTMENT ENCOUNTER      PCP: Martha Cummins APRN  Patient Care Team:  Martha Cummins APRN as PCP - General (Family Medicine)   Independent Historians: Patient    HPI:  Chief Complaint: Difficulty swallowing   A complete HPI/ROS/PMH/PSH/SH/FH are unobtainable due to: None    Chronic or social conditions impacting patient care (social determinants of health): None    Context: Poncho Patel is a 54 y.o. male w/ hx of sleep apnea, asthma who presents to the ED c/o acute difficulty swallowing. Pt states over the past several months he has been experiencing increasing difficulty with swallowing intermittently. Today, the patient had an episode where he felt what he was eating became stuck and he had significant pain. He had an episode of vomiting and was then unable to eat or drink without immediately vomiting. Due to severity of this episode he present to ER for evaluation. He denies personal hx of GERD, esophageal stricture or dysmotility. He does have several family members with GERD, Torres's esophagus, esophageal cancer.    Review of prior external notes and/or external test results outside of this encounter: 9/21/23 CMP showed glucose of 104 and was otherwise unremarkable. Hemoglobin A1c from same date was 5.7.      PAST MEDICAL HISTORY  Active Ambulatory Problems     Diagnosis Date Noted    Exercise-induced bronchospasm 08/03/2018    Chronic non-seasonal allergic rhinitis 08/03/2018     Resolved Ambulatory Problems     Diagnosis Date Noted    No Resolved Ambulatory Problems     Past Medical History:   Diagnosis Date    Arthritis 1977    Asthma 1970s    Fracture, foot 1980s    Hip arthrosis 1977    Knee swelling 1977    Osgood-Schlatter's disease 1980    Sleep apnea     Tear of meniscus of knee Now    Wrist sprain        The patient has started, but not completed, their COVID-19 vaccination series.    PAST SURGICAL HISTORY  Past Surgical History:   Procedure Laterality Date    ADENOIDECTOMY       TONSILLECTOMY           FAMILY HISTORY  Family History   Problem Relation Age of Onset    Heart disease Mother     Hyperlipidemia Mother     Diabetes Mother     Arthritis Mother     Migraines Mother     Diabetes type II Brother     Torres's esophagus Brother     Diabetes Brother     Hyperlipidemia Maternal Grandmother     Stroke Maternal Grandmother     Scoliosis Maternal Grandmother     Cancer Maternal Grandfather         LUNG    Hyperlipidemia Maternal Aunt     Alcohol abuse Maternal Aunt     Diabetes Maternal Aunt     Cancer Maternal Uncle         ESOPHGEAL    Alcohol abuse Maternal Uncle     Cancer Maternal Uncle          SOCIAL HISTORY  Social History     Socioeconomic History    Marital status: Single   Tobacco Use    Smoking status: Never    Smokeless tobacco: Never   Vaping Use    Vaping status: Never Used   Substance and Sexual Activity    Alcohol use: Yes     Comment: Rare 1 X month    Drug use: Never    Sexual activity: Not Currently     Partners: Female         ALLERGIES  Patient has no known allergies.        REVIEW OF SYSTEMS  Review of Systems   HENT:  Positive for trouble swallowing.    Respiratory:  Negative for shortness of breath.    Cardiovascular:  Negative for chest pain.        All systems reviewed and negative except for those discussed in HPI.       PHYSICAL EXAM    I have reviewed the triage vital signs and nursing notes.    ED Triage Vitals   Temp Heart Rate Resp BP SpO2   11/05/24 1756 11/05/24 1756 11/05/24 1756 11/05/24 1809 11/05/24 1756   97.5 °F (36.4 °C) 95 18 170/89 98 %      Temp src Heart Rate Source Patient Position BP Location FiO2 (%)   11/05/24 1756 -- -- -- --   Tympanic           Physical Exam  GENERAL: alert, no acute distress  SKIN: Warm, dry  HENT: Normocephalic, atraumatic, tolerating oral secretions without difficulty  EYES: no scleral icterus  CV: regular rhythm, regular rate, distal pulses 2+ and symmetric  RESPIRATORY: normal effort, lungs clear  ABDOMEN: soft,  nontender, nondistended  MUSCULOSKELETAL: no deformity  NEURO: alert, moves all extremities, follows commands          LAB RESULTS  Recent Results (from the past 24 hours)   ECG 12 Lead Other; near syncope    Collection Time: 11/05/24  8:49 PM   Result Value Ref Range    QT Interval 378 ms    QTC Interval 423 ms       Ordered the above labs and independently reviewed and interpreted the results.        RADIOLOGY  XR Chest 1 View    Result Date: 11/5/2024  XR CHEST 1 VW-  INDICATION: Difficulty swallowing  COMPARISON: None available      No focal consolidation. No pleural effusion or pneumothorax.  Normal size cardiomediastinal silhouette.  No focal osseous abnormality.   This report was finalized on 11/5/2024 9:27 PM by Dr. Robin Le M.D on Workstation: [id number]       I ordered the above noted radiological studies. Independently reviewed and interpreted by me.  See dictation for official radiology interpretation.      PROCEDURES    Procedures      MEDICATIONS GIVEN IN ER    Medications - No data to display      PROGRESS, DATA ANALYSIS, CONSULTS, AND MEDICAL DECISION MAKING    All labs have been independently reviewed and interpreted by me.  All radiology studies have been independently reviewed and interpreted by me and discussed with radiologist dictating the report.   EKG's independently reviewed and interpreted by me.  Discussion below represents my analysis of pertinent findings related to patient's condition, differential diagnosis, treatment plan and final disposition.    Differential diagnosis: food bolus, esophageal structure, GERD, mass    ED Course as of 11/06/24 0933   Tue Nov 05, 2024   2115 Pt able to tolerate PO. Offered admission to observation unit but patient prefers discharge and outpatient follow up. [DC]   2120 XR Chest 1 View  Radiology study independently interpreted by me and my findings are no pneumothorax.   [DC]      ED Course User Index  [DC] Lizz Barragan PA             AS  OF 09:33 EST VITALS:    BP - 160/95  HR - 91  TEMP - 97.5 °F (36.4 °C) (Tympanic)  O2 SATS - 96%        DIAGNOSIS  Final diagnoses:   Dysphagia, unspecified type         DISPOSITION  ED Disposition       ED Disposition   Discharge    Condition   Stable    Comment   --                  Note Disclaimer: At [facility], we believe that sharing information builds trust and better relationships. You are receiving this note because you recently visited [facility]. It is possible you will see health information before a provider has talked with you about it. This kind of information can be easy to misunderstand. To help you fully understand what it means for your health, we urge you to discuss this note with your provider.         Lizz Barragan PA  11/06/24 0951

## 2024-11-06 NOTE — ED PROVIDER NOTES
MD ATTESTATION NOTE    The VANDANA and I have discussed this patient's history, physical exam, and treatment plan.  I have reviewed the documentation and personally had a face to face interaction with the patient. I affirm the documentation and agree with the treatment and plan.  The attached note describes my personal findings.        SHARED APC FACE TO FACE: I performed a substantive part of the MDM during the patient's E/M visit. I personally evaluated and examined the patient. I personally made or approved the documented management plan and acknowledge its risk of complications.      Brief HPI: Patient presents for evaluation of esophageal food bolus.  Patient states he was eating chicken.  Patient states he felt like it got stuck.  States it does feel like it is improved.  He is able to swallow his secretions.    PHYSICAL EXAM  ED Triage Vitals   Temp Heart Rate Resp BP SpO2   11/05/24 1756 11/05/24 1756 11/05/24 1756 11/05/24 1809 11/05/24 1756   97.5 °F (36.4 °C) 95 18 170/89 98 %      Temp src Heart Rate Source Patient Position BP Location FiO2 (%)   11/05/24 1756 -- -- -- --   Tympanic             GENERAL: no acute distress  HENT: nares patent  EYES: no scleral icterus  CV: regular rhythm, normal rate  RESPIRATORY: normal effort  ABDOMEN: soft  MUSCULOSKELETAL: no deformity  NEURO: alert, moves all extremities, follows commands  PSYCH:  calm, cooperative  SKIN: warm, dry    Vital signs and nursing notes reviewed.    ED Course as of 11/06/24 1851   Tue Nov 05, 2024 2115 Pt able to tolerate PO. Offered admission to observation unit but patient prefers discharge and outpatient follow up. [DC]   2120 XR Chest 1 View  Radiology study independently interpreted by me and my findings are no pneumothorax.   [DC]      ED Course User Index  [DC] Lizz Barragan PA         Plan: discharge       David Steward MD  11/06/24 0027       David Steward MD  11/06/24 1851

## 2024-11-06 NOTE — TELEPHONE ENCOUNTER
Hub staff attempted to follow warm transfer process and was unsuccessful     Caller: Poncho Patel    Relationship to patient: Self    Best call back number: [phone]    Patient is needing: PT ED VISIT 11/5 RECOMMEND PT SEE DR PORTILLO AS SOON AS CAN FOR FOLLOW UP.    PLEASE REACH OUT TO PT. THANK YOU.

## 2024-11-11 ENCOUNTER — TELEPHONE (OUTPATIENT)
Dept: GASTROENTEROLOGY | Facility: CLINIC | Age: 54
End: 2024-11-11
Payer: COMMERCIAL

## 2024-11-11 ENCOUNTER — OFFICE VISIT (OUTPATIENT)
Dept: GASTROENTEROLOGY | Facility: CLINIC | Age: 54
End: 2024-11-11
Payer: COMMERCIAL

## 2024-11-11 VITALS
DIASTOLIC BLOOD PRESSURE: 82 MMHG | SYSTOLIC BLOOD PRESSURE: 128 MMHG | HEART RATE: 82 BPM | WEIGHT: 176 LBS | HEIGHT: 66 IN | BODY MASS INDEX: 28.28 KG/M2

## 2024-11-11 DIAGNOSIS — R13.10 DYSPHAGIA, UNSPECIFIED TYPE: Primary | ICD-10-CM

## 2024-11-11 DIAGNOSIS — Z12.11 ENCOUNTER FOR SCREENING FOR MALIGNANT NEOPLASM OF COLON: ICD-10-CM

## 2024-11-11 NOTE — PROGRESS NOTES
"Chief Complaint  Difficulty Swallowing    Subjective          History of Present Illness    Poncho Patel is a  54 y.o. male presents for follow-up after recent visit to the emergency department.  He is new to our practice.  He will be following with Dr. Mayberry.    He presented to the emergency department 11/6/2024 with history of sleep apnea, asthma and complaints of acute difficulty swallowing.  This has been worsening over the last several months.  He had a chest x-ray completed which was unremarkable. Prolonged contraction on Tuesday.  Patient reports that last Tuesday he did at [employer] and felt like it got lodged before going into the stomach.  He said this resulted in a pulsing pain in his abdomen.  He states he has had issues for several months of what feel like spasms until Tuesday when he felt like the closing of his esophagus was more prolonged.  He denies frequent reflux other than occasional acid after laying down after eating.  He has regular bowel movements, no black or bloody stool.    Brother  has Torres's esophagus. No family history of colon cancer.     Objective   Vital Signs:   /82 (BP Location: Right arm, Patient Position: Sitting, Cuff Size: Adult)   Pulse 82   Ht 167.6 cm (66\")   Wt 79.8 kg (176 lb) Comment: Patient reported  BMI 28.41 kg/m²       Physical Exam  Constitutional:       General: He is not in acute distress.     Appearance: Normal appearance.   Eyes:      General: No scleral icterus.  Cardiovascular:      Rate and Rhythm: Normal rate.   Pulmonary:      Effort: Pulmonary effort is normal.   Abdominal:      General: Abdomen is flat. Bowel sounds are normal. There is no distension.      Tenderness: There is no abdominal tenderness. There is no guarding.   Skin:     Coloration: Skin is not jaundiced.   Neurological:      General: No focal deficit present.      Mental Status: He is alert and oriented to person, place, and time.   Psychiatric:         Mood and Affect: " Mood normal.         Behavior: Behavior normal.          Result Review :   The following data was reviewed by: Hanny Griggs PA-C on 11/11/2024:              Assessment:   Diagnoses and all orders for this visit:    1. Dysphagia, unspecified type (Primary)  -     Case Request; Standing  -     Implement Anesthesia orders day of procedure.; Standing  -     Verify bowel prep was successful; Standing  -     Give tap water enema if bowel prep was insufficient; Standing  -     Case Request    2. Encounter for screening for malignant neoplasm of colon  -     Case Request; Standing  -     Implement Anesthesia orders day of procedure.; Standing  -     Verify bowel prep was successful; Standing  -     Give tap water enema if bowel prep was insufficient; Standing  -     Case Request          Plan:   -Recommend continuing on pantoprazole for difficulty swallowing/esophageal spasms.  Recommend EGD for further evaluation as well.  Advised that he avoid spicy/acidic foods/caffeine/coffee in the meantime and be sure to chew up his food really well prior to swallowing  -He has never had a screening colonoscopy.  He would like to add this along with his EGD.  Will get this scheduled  -Risks (including, but not limited to perforation, bleeding, sedation-related complications, and death), benefits, and alternatives to the procedure were discussed with the patient and all questions were answered.     Further recommendations and follow-up will be based on endoscopic findings.      Follow Up   Return for EGD/Colonosocpy .    Dragon dictation used throughout this note.         Hanny Griggs PA-C  [facility], [facility]  [address] [place] [address]  Office: [phone]

## 2024-11-11 NOTE — TELEPHONE ENCOUNTER
CORBY TIRADO  FOR COLON AND EGD    ON  12/02/2024  ARRIVE [id number]  Mailed Prep instructions to Mailing address on-file. OK FOR HUB TO READ

## 2024-11-11 NOTE — Clinical Note
"54-year-old male recently seen in the emergency department for dysphagia.  This has been getting worse for the last several months and he describes his episodes as \"spasms\".  Scheduled for EGD and added on screening colonoscopy as he has never had 1 before. Recommended continuing on pantoprazole in the meantime. "

## 2024-12-02 ENCOUNTER — HOSPITAL ENCOUNTER (OUTPATIENT)
Facility: HOSPITAL | Age: 54
Setting detail: HOSPITAL OUTPATIENT SURGERY
Discharge: HOME | End: 2024-12-02
Attending: INTERNAL MEDICINE | Admitting: INTERNAL MEDICINE
Payer: COMMERCIAL

## 2024-12-02 ENCOUNTER — ANESTHESIA (OUTPATIENT)
Dept: GASTROENTEROLOGY | Facility: HOSPITAL | Age: 54
End: 2024-12-02
Payer: COMMERCIAL

## 2024-12-02 ENCOUNTER — ANESTHESIA EVENT (OUTPATIENT)
Dept: GASTROENTEROLOGY | Facility: HOSPITAL | Age: 54
End: 2024-12-02
Payer: COMMERCIAL

## 2024-12-02 VITALS
RESPIRATION RATE: 16 BRPM | SYSTOLIC BLOOD PRESSURE: 98 MMHG | OXYGEN SATURATION: 94 % | HEIGHT: 66 IN | BODY MASS INDEX: 28.28 KG/M2 | WEIGHT: 176 LBS | DIASTOLIC BLOOD PRESSURE: 64 MMHG | HEART RATE: 61 BPM

## 2024-12-02 DIAGNOSIS — R13.10 DYSPHAGIA, UNSPECIFIED TYPE: ICD-10-CM

## 2024-12-02 DIAGNOSIS — Z12.11 ENCOUNTER FOR SCREENING FOR MALIGNANT NEOPLASM OF COLON: ICD-10-CM

## 2024-12-02 PROCEDURE — 25010000002 PROPOFOL 10 MG/ML EMULSION: Performed by: NURSE ANESTHETIST, CERTIFIED REGISTERED

## 2024-12-02 PROCEDURE — 25010000002 LIDOCAINE 2% SOLUTION: Performed by: NURSE ANESTHETIST, CERTIFIED REGISTERED

## 2024-12-02 PROCEDURE — S0260 H&P FOR SURGERY: HCPCS | Performed by: INTERNAL MEDICINE

## 2024-12-02 PROCEDURE — C1726 CATH, BAL DIL, NON-VASCULAR: HCPCS | Performed by: INTERNAL MEDICINE

## 2024-12-02 PROCEDURE — 25810000003 LACTATED RINGERS PER 1000 ML: Performed by: INTERNAL MEDICINE

## 2024-12-02 PROCEDURE — 88305 TISSUE EXAM BY PATHOLOGIST: CPT | Performed by: INTERNAL MEDICINE

## 2024-12-02 RX ORDER — SODIUM CHLORIDE, SODIUM LACTATE, POTASSIUM CHLORIDE, CALCIUM CHLORIDE 600; 310; 30; 20 MG/100ML; MG/100ML; MG/100ML; MG/100ML
30 INJECTION, SOLUTION INTRAVENOUS CONTINUOUS PRN
Status: DISCONTINUED | OUTPATIENT
Start: 2024-12-02 | End: 2024-12-02 | Stop reason: HOSPADM

## 2024-12-02 RX ORDER — PROPOFOL 10 MG/ML
VIAL (ML) INTRAVENOUS AS NEEDED
Status: DISCONTINUED | OUTPATIENT
Start: 2024-12-02 | End: 2024-12-02 | Stop reason: SURG

## 2024-12-02 RX ORDER — PANTOPRAZOLE SODIUM 40 MG/1
40 TABLET, DELAYED RELEASE ORAL
Qty: 90 TABLET | Refills: 0 | Status: SHIPPED | OUTPATIENT
Start: 2024-12-02 | End: 2025-03-02

## 2024-12-02 RX ORDER — LIDOCAINE HYDROCHLORIDE 20 MG/ML
INJECTION, SOLUTION INFILTRATION; PERINEURAL AS NEEDED
Status: DISCONTINUED | OUTPATIENT
Start: 2024-12-02 | End: 2024-12-02 | Stop reason: SURG

## 2024-12-02 RX ADMIN — LIDOCAINE HYDROCHLORIDE 50 MG: 20 INJECTION, SOLUTION INFILTRATION; PERINEURAL at 13:52

## 2024-12-02 RX ADMIN — SODIUM CHLORIDE, SODIUM LACTATE, POTASSIUM CHLORIDE, CALCIUM CHLORIDE 30 ML/HR: 20; 30; 600; 310 INJECTION, SOLUTION INTRAVENOUS at 13:11

## 2024-12-02 RX ADMIN — PROPOFOL 100 MG: 10 INJECTION, EMULSION INTRAVENOUS at 13:52

## 2024-12-02 RX ADMIN — PROPOFOL 200 MCG/KG/MIN: 10 INJECTION, EMULSION INTRAVENOUS at 13:52

## 2024-12-02 NOTE — DISCHARGE INSTRUCTIONS
For the next 24 hours patient needs to be with a responsible adult.    For 24 hours DO NOT drive, operate machinery, appliances, drink alcohol, make important decisions or sign legal documents.    Start with a light or bland diet if you are feeling sick to your stomach otherwise advance to regular diet as tolerated.    Follow recommendations on procedure report if provided by your doctor.    Call Dr Mayberry for problems [phone]    Problems may include but not limited to: large amounts of bleeding, trouble breathing, repeated vomiting, severe unrelieved pain, fever or chills.

## 2024-12-02 NOTE — ANESTHESIA POSTPROCEDURE EVALUATION
Patient: Poncho Patel    Procedure Summary       Date: 12/02/24 Room / Location:  [facility]    Anesthesia Start: 1349 Anesthesia Stop: 1418    Procedures:       COLONOSCOPY into cecum with cold snare polypectomy      ESOPHAGOGASTRODUODENOSCOPY with biopsies and baloon dilation 10-12mm (Esophagus) Diagnosis:       Dysphagia, unspecified type      Encounter for screening for malignant neoplasm of colon      (Dysphagia, unspecified type [R13.10])      (Encounter for screening for malignant neoplasm of colon [Z12.11])    Surgeons: Yvonne Mayberry MD Provider: Christopher Gee MD    Anesthesia Type: MAC ASA Status: 2            Anesthesia Type: MAC    Vitals  Vitals Value Taken Time   /67 12/02/24 1415   Temp     Pulse 72 12/02/24 1423   Resp 16 12/02/24 1414   SpO2 89 % 12/02/24 1423   Vitals shown include unfiled device data.        Post Anesthesia Care and Evaluation    Patient location during evaluation: PACU  Patient participation: complete - patient participated  Level of consciousness: awake and alert  Pain management: adequate    Airway patency: patent  Anesthetic complications: No anesthetic complications    Cardiovascular status: acceptable  Respiratory status: acceptable  Hydration status: acceptable    Comments: --------------------            12/02/24               1414     --------------------   BP:       104/67     Pulse:      75       Resp:       16       SpO2:      92%      --------------------

## 2024-12-02 NOTE — H&P
[facility]  Pre Procedure History & Physical    Chief Complaint:   Dysphagia, screening    Subjective     HPI:   55 yo here today for egd/colonoscopy.  Pt reports no FH CRC/polyps.  Patient reports recent issues with swallowing solids - went to the ER 11/11/24 for this.   No previous colonoscopy.    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    Osgood-Schlatter's disease 1980    Childhood diagnosis    Sleep apnea     DOES NOT WEAR CPAP       Past Surgical History:  Past Surgical History:   Procedure Laterality Date    ADENOIDECTOMY      TONSILLECTOMY         Family History:  Family History   Problem Relation Age of Onset    Heart disease Mother     Hyperlipidemia Mother     Diabetes Mother     Arthritis Mother     Migraines Mother     Diabetes type II Brother     Torres's esophagus Brother     Diabetes Brother     Hyperlipidemia Maternal Aunt     Alcohol abuse Maternal Aunt     Diabetes Maternal Aunt     Cancer Maternal Uncle         ESOPHGEAL    Alcohol abuse Maternal Uncle     Esophageal cancer Maternal Uncle     Cancer Maternal Uncle     Hyperlipidemia Maternal Grandmother     Stroke Maternal Grandmother     Scoliosis Maternal Grandmother     Cancer Maternal Grandfather         LUNG    Malig Hyperthermia Neg Hx        Social History:   reports that he has never smoked. He has never used smokeless tobacco. He reports current alcohol use. He reports that he does not use drugs.    Medications:   Medications  "Prior to Admission   Medication Sig Dispense Refill Last Dose/Taking    cetirizine (zyrTEC) 10 MG tablet Take 1 tablet by mouth Daily.   Past Week    pantoprazole (PROTONIX) 40 MG EC tablet Take 1 tablet by mouth Daily. 30 tablet 0 11/29/2024    albuterol sulfate  (90 Base) MCG/ACT inhaler Inhale 2 puffs Every 4 (Four) Hours As Needed for Wheezing. 18 g 5 More than a month       Allergies:  Patient has no known allergies.    ROS:    Pertinent items are noted in HPI     Objective     Blood pressure 132/90, pulse 77, resp. rate 20, height 167.6 cm (66\"), weight 79.8 kg (176 lb), SpO2 93%.    Physical Exam   Constitutional: Pt is oriented to person, place, and time and well-developed, well-nourished, and in no distress.   Mouth/Throat: Oropharynx is clear and moist.   Neck: Normal range of motion.   Cardiovascular: Normal rate, regular rhythm    Pulmonary/Chest: Effort normal    Abdominal: Soft. Nontender  Skin: Skin is warm and dry.   Psychiatric: Mood, memory, affect and judgment normal.     Assessment & Plan     Diagnosis:  Dysphagia/Screening for colon cancer    Anticipated Surgical Procedure:  Egd/colonoscopy    The risks, benefits, and alternatives of this procedure have been discussed with the patient or the responsible party- the patient understands and agrees to proceed.                                                              "

## 2024-12-02 NOTE — ANESTHESIA PREPROCEDURE EVALUATION
Anesthesia Evaluation     Patient summary reviewed and Nursing notes reviewed                Airway   Mallampati: II  Dental      Pulmonary    (+) asthma,sleep apnea  Cardiovascular - negative cardio ROS    ECG reviewed  Rhythm: regular  Rate: normal        Neuro/Psych- negative ROS  GI/Hepatic/Renal/Endo    (+) GERD    Musculoskeletal     Abdominal    Substance History   (+) alcohol use     OB/GYN negative ob/gyn ROS         Other   arthritis,                   Anesthesia Plan    ASA 2     MAC     intravenous induction     Anesthetic plan, risks, benefits, and alternatives have been provided, discussed and informed consent has been obtained with: patient.    CODE STATUS:

## 2024-12-03 LAB
CYTO UR: NORMAL
LAB AP CASE REPORT: NORMAL
PATH REPORT.FINAL DX SPEC: NORMAL
PATH REPORT.GROSS SPEC: NORMAL

## 2024-12-06 ENCOUNTER — PREP FOR SURGERY (OUTPATIENT)
Dept: OTHER | Facility: HOSPITAL | Age: 54
End: 2024-12-06
Payer: COMMERCIAL

## 2024-12-06 DIAGNOSIS — K21.00 GASTROESOPHAGEAL REFLUX DISEASE WITH ESOPHAGITIS WITHOUT HEMORRHAGE: Primary | ICD-10-CM

## 2024-12-06 NOTE — PROGRESS NOTES
Esophagus with reflux related inflammation.  Torres's esophagus was not seen. Use Prilosec (omeprazole) 20 mg PO BID for 3 months. Repeat upper endoscopy in 3 months to check healing      The polyp(s) biopsies showed adenomatous change. This is not cancerous but is considered potentially precancerous. Follow-up colonoscopy in 5 years is advised.

## 2024-12-09 ENCOUNTER — TELEPHONE (OUTPATIENT)
Dept: GASTROENTEROLOGY | Facility: CLINIC | Age: 54
End: 2024-12-09
Payer: COMMERCIAL

## 2024-12-09 NOTE — TELEPHONE ENCOUNTER
----- Message from Yvonne Mayberry sent at 12/6/2024  3:27 PM EST -----  Esophagus with reflux related inflammation.  Torres's esophagus was not seen. Use Prilosec (omeprazole) 20 mg PO BID for 3 months. Repeat upper endoscopy in 3 months to check healing      The polyp(s) biopsies showed adenomatous change. This is not cancerous but is considered potentially precancerous. Follow-up colonoscopy in 5 years is advised.

## 2024-12-11 ENCOUNTER — TELEPHONE (OUTPATIENT)
Dept: GASTROENTEROLOGY | Facility: CLINIC | Age: 54
End: 2024-12-11
Payer: COMMERCIAL

## 2024-12-11 NOTE — TELEPHONE ENCOUNTER
PT IS GOING TO CALL US WHEN HE IS READY TO APARNA APPT HE IS AWARE OF UPPER SCOPE NEEDING TO BE APARNA

## 2025-02-25 RX ORDER — PANTOPRAZOLE SODIUM 40 MG/1
40 TABLET, DELAYED RELEASE ORAL
Qty: 180 TABLET | Refills: 0 | Status: SHIPPED | OUTPATIENT
Start: 2025-02-25

## 2025-02-26 ENCOUNTER — TELEPHONE (OUTPATIENT)
Dept: GASTROENTEROLOGY | Facility: CLINIC | Age: 55
End: 2025-02-26
Payer: COMMERCIAL

## 2025-02-26 NOTE — TELEPHONE ENCOUNTER
"My chart message from pt;  Good morning Dr. Mayberry, I spoke with the pharmacy in person this morning. The insurance isn't wanting to cover the prescribed \"twice a day\" at the dosage requested. The pharmacy faxed over the request to your office on the 23rd and again this morning. As a stopgap, the pharmacist gave me three tablets. He seemed to think the insurance is fine with once a day at that dose.   "

## 2025-02-28 ENCOUNTER — TELEPHONE (OUTPATIENT)
Dept: GASTROENTEROLOGY | Facility: CLINIC | Age: 55
End: 2025-02-28
Payer: COMMERCIAL

## 2025-02-28 NOTE — TELEPHONE ENCOUNTER
Pa submitted via [facility] [id number]      PA approved from 02/28/2025 to 02/28/2028.     Pt notified via [employer]

## (undated) DEVICE — KT ORCA ORCAPOD DISP STRL

## (undated) DEVICE — TUBING, SUCTION, 1/4" X 10', STRAIGHT: Brand: MEDLINE

## (undated) DEVICE — THE SINGLE USE ETRAP – POLYP TRAP IS USED FOR SUCTION RETRIEVAL OF ENDOSCOPICALLY REMOVED POLYPS.: Brand: ETRAP

## (undated) DEVICE — DEV INFL ALLIANCE2 SYS

## (undated) DEVICE — SENSR O2 OXIMAX FNGR A/ 18IN NONSTR

## (undated) DEVICE — BLCK/BITE BLOX W/DENTL/RIM W/STRAP 54F

## (undated) DEVICE — CANN O2 ETCO2 FITS ALL CONN CO2 SMPL A/ 7IN DISP LF

## (undated) DEVICE — ADAPT CLN BIOGUARD AIR/H2O DISP

## (undated) DEVICE — ESOPHAGEAL/PYLORIC/COLONIC WIREGUIDED BALLOON DILATATION CATHETER: Brand: CRE WIREGUIDED

## (undated) DEVICE — SINGLE-USE BIOPSY FORCEPS: Brand: RADIAL JAW 4

## (undated) DEVICE — LN SMPL CO2 SHTRM SD STREAM W/M LUER

## (undated) DEVICE — SNAR POLYP SENSATION STDOVL 27 240 BX40